# Patient Record
Sex: FEMALE | Race: WHITE | Employment: OTHER | URBAN - METROPOLITAN AREA
[De-identification: names, ages, dates, MRNs, and addresses within clinical notes are randomized per-mention and may not be internally consistent; named-entity substitution may affect disease eponyms.]

---

## 2017-02-18 ENCOUNTER — APPOINTMENT (OUTPATIENT)
Dept: GENERAL RADIOLOGY | Facility: HOSPITAL | Age: 52
End: 2017-02-18

## 2017-02-18 ENCOUNTER — HOSPITAL ENCOUNTER (EMERGENCY)
Facility: HOSPITAL | Age: 52
Discharge: HOME | End: 2017-02-18
Attending: EMERGENCY MEDICINE

## 2017-02-18 VITALS
BODY MASS INDEX: 41.28 KG/M2 | DIASTOLIC BLOOD PRESSURE: 68 MMHG | RESPIRATION RATE: 18 BRPM | SYSTOLIC BLOOD PRESSURE: 128 MMHG | HEIGHT: 67 IN | HEART RATE: 85 BPM | WEIGHT: 263 LBS | OXYGEN SATURATION: 99 % | TEMPERATURE: 98 F

## 2017-02-18 DIAGNOSIS — R07.89 CHEST WALL PAIN: Primary | ICD-10-CM

## 2017-02-18 LAB
ALBUMIN SERPL-MCNC: 4.2 G/DL (ref 3.5–4.8)
ALP LIVER SERPL-CCNC: 83 U/L (ref 41–108)
ALT SERPL-CCNC: 57 U/L (ref 14–54)
AST SERPL-CCNC: 32 U/L (ref 15–41)
BASOPHILS # BLD AUTO: 0.06 X10(3) UL (ref 0–0.1)
BASOPHILS NFR BLD AUTO: 0.9 %
BILIRUB SERPL-MCNC: 0.4 MG/DL (ref 0.1–2)
BUN BLD-MCNC: 19 MG/DL (ref 8–20)
CALCIUM BLD-MCNC: 8.5 MG/DL (ref 8.3–10.3)
CHLORIDE: 110 MMOL/L (ref 101–111)
CO2: 24 MMOL/L (ref 22–32)
CREAT BLD-MCNC: 0.97 MG/DL (ref 0.55–1.02)
EOSINOPHIL # BLD AUTO: 0.08 X10(3) UL (ref 0–0.3)
EOSINOPHIL NFR BLD AUTO: 1.2 %
ERYTHROCYTE [DISTWIDTH] IN BLOOD BY AUTOMATED COUNT: 12.5 % (ref 11.5–16)
GLUCOSE BLD-MCNC: 107 MG/DL (ref 70–99)
HCT VFR BLD AUTO: 45.1 % (ref 34–50)
HGB BLD-MCNC: 15.9 G/DL (ref 12–16)
IMMATURE GRANULOCYTE COUNT: 0.02 X10(3) UL (ref 0–1)
IMMATURE GRANULOCYTE RATIO %: 0.3 %
LYMPHOCYTES # BLD AUTO: 2.29 X10(3) UL (ref 0.9–4)
LYMPHOCYTES NFR BLD AUTO: 33.2 %
M PROTEIN MFR SERPL ELPH: 7.3 G/DL (ref 6.1–8.3)
MCH RBC QN AUTO: 32.4 PG (ref 27–33.2)
MCHC RBC AUTO-ENTMCNC: 35.3 G/DL (ref 31–37)
MCV RBC AUTO: 91.9 FL (ref 81–100)
MONOCYTES # BLD AUTO: 0.31 X10(3) UL (ref 0.1–0.6)
MONOCYTES NFR BLD AUTO: 4.5 %
NEUTROPHIL ABS PRELIM: 4.14 X10 (3) UL (ref 1.3–6.7)
NEUTROPHILS # BLD AUTO: 4.14 X10(3) UL (ref 1.3–6.7)
NEUTROPHILS NFR BLD AUTO: 59.9 %
PLATELET # BLD AUTO: 220 10(3)UL (ref 150–450)
POTASSIUM SERPL-SCNC: 4 MMOL/L (ref 3.6–5.1)
RBC # BLD AUTO: 4.91 X10(6)UL (ref 3.8–5.1)
RED CELL DISTRIBUTION WIDTH-SD: 41.7 FL (ref 35.1–46.3)
SODIUM SERPL-SCNC: 142 MMOL/L (ref 136–144)
TROPONIN: <0.046 NG/ML (ref ?–0.05)
WBC # BLD AUTO: 6.9 X10(3) UL (ref 4–13)

## 2017-02-18 PROCEDURE — 99284 EMERGENCY DEPT VISIT MOD MDM: CPT

## 2017-02-18 PROCEDURE — 93005 ELECTROCARDIOGRAM TRACING: CPT

## 2017-02-18 PROCEDURE — 84484 ASSAY OF TROPONIN QUANT: CPT | Performed by: EMERGENCY MEDICINE

## 2017-02-18 PROCEDURE — 85025 COMPLETE CBC W/AUTO DIFF WBC: CPT | Performed by: EMERGENCY MEDICINE

## 2017-02-18 PROCEDURE — 93010 ELECTROCARDIOGRAM REPORT: CPT

## 2017-02-18 PROCEDURE — 36415 COLL VENOUS BLD VENIPUNCTURE: CPT

## 2017-02-18 PROCEDURE — 99285 EMERGENCY DEPT VISIT HI MDM: CPT

## 2017-02-18 PROCEDURE — 80053 COMPREHEN METABOLIC PANEL: CPT | Performed by: EMERGENCY MEDICINE

## 2017-02-18 PROCEDURE — 71010 XR CHEST AP PORTABLE  (CPT=71010): CPT

## 2017-02-18 RX ORDER — ONDANSETRON 2 MG/ML
4 INJECTION INTRAMUSCULAR; INTRAVENOUS ONCE
Status: COMPLETED | OUTPATIENT
Start: 2017-02-18 | End: 2017-02-18

## 2017-02-18 RX ORDER — VALSARTAN 320 MG/1
320 TABLET ORAL DAILY
COMMUNITY
End: 2017-07-13

## 2017-02-18 NOTE — ED INITIAL ASSESSMENT (HPI)
Pt presents to ER a sternal to the left side chest pain. Yes SOB. Pain has been coming and going. Pt is speak clearly. Skin warm and dry. Respirations equal and nonlabored. Pt is a&ox3. Yes nausea coming and going. Yes fatigue. Chronic HAs. Cold sweats.  No

## 2017-02-18 NOTE — ED PROVIDER NOTES
Patient Seen in: [facility] Emergency Department    History   Patient presents with:  Chest Pain Angina (cardiovascular)    Stated Complaint: chest pain    HPI    41-year-old female presents emergency department who has some pain on the left side of -40 MG Oral Tab,  Take 1 tablet by mouth every 4 (four) hours as needed for Pain. Ondansetron HCl (ZOFRAN) 4 mg tablet,  Take 1 tablet by mouth every 8 (eight) hours as needed for Nausea.    ClonazePAM 2 MG Oral Tablet Dispersible,  Take 2 mg by marily SYRINGE) 25G X 5/8\" 3 ML Does not apply Misc,  USE ONCE MONTHLY AS DIRECTED   LUNESTA 3 MG OR TABS,  1 TABLET AT BEDTIME PRN       Family History   Problem Relation Age of Onset   • Diabetes Mother    • Sonda Marie Mother    • Stroke Mother    • to the RSD she states Neuro: Cranial nerves II through XII intact with no gross focal sensory or motor abnormality.       ED Course     Labs Reviewed   COMP METABOLIC PANEL (14) - Abnormal; Notable for the following:     Glucose 107 (*)     Alt 57 (*)     A Patient was made aware of medical condition and instructed to take medications as prescribed. Patient is aware that they are to return to ED if any worsening problems. Patient was also instructed to followup with the appropriate physician.   Patient

## 2017-02-20 LAB
ATRIAL RATE: 88 BPM
P AXIS: 51 DEGREES
P-R INTERVAL: 158 MS
Q-T INTERVAL: 398 MS
QRS DURATION: 92 MS
QTC CALCULATION (BEZET): 481 MS
R AXIS: 55 DEGREES
T AXIS: 55 DEGREES
VENTRICULAR RATE: 88 BPM

## 2017-10-04 ENCOUNTER — APPOINTMENT (OUTPATIENT)
Dept: LAB | Facility: HOSPITAL | Age: 52
End: 2017-10-04
Payer: MEDICARE

## 2017-10-04 ENCOUNTER — HOSPITAL ENCOUNTER (OUTPATIENT)
Dept: ULTRASOUND IMAGING | Facility: HOSPITAL | Age: 52
Discharge: HOME | End: 2017-10-04
Attending: SURGERY
Payer: MEDICARE

## 2017-10-04 DIAGNOSIS — K80.10 CALCULUS OF GALLBLADDER WITH CHOLECYSTITIS WITHOUT BILIARY OBSTRUCTION, UNSPECIFIED CHOLECYSTITIS ACUITY: ICD-10-CM

## 2017-10-04 DIAGNOSIS — K80.10 CHOLECYSTITIS WITH CHOLELITHIASIS: ICD-10-CM

## 2017-10-04 PROCEDURE — 76700 US EXAM ABDOM COMPLETE: CPT | Performed by: SURGERY

## 2017-10-04 PROCEDURE — 36415 COLL VENOUS BLD VENIPUNCTURE: CPT

## 2017-10-04 PROCEDURE — 80053 COMPREHEN METABOLIC PANEL: CPT

## 2017-10-09 ENCOUNTER — ANESTHESIA (OUTPATIENT)
Dept: SURGERY | Facility: HOSPITAL | Age: 52
End: 2017-10-09
Payer: MEDICARE

## 2017-10-09 ENCOUNTER — ANESTHESIA EVENT (OUTPATIENT)
Dept: SURGERY | Facility: HOSPITAL | Age: 52
End: 2017-10-09
Payer: MEDICARE

## 2017-10-09 ENCOUNTER — HOSPITAL ENCOUNTER (OUTPATIENT)
Facility: HOSPITAL | Age: 52
Setting detail: HOSPITAL OUTPATIENT SURGERY
Discharge: HOME | End: 2017-10-09
Attending: SURGERY | Admitting: SURGERY
Payer: MEDICARE

## 2017-10-09 ENCOUNTER — SURGERY (OUTPATIENT)
Age: 52
End: 2017-10-09

## 2017-10-09 VITALS
OXYGEN SATURATION: 100 % | RESPIRATION RATE: 18 BRPM | SYSTOLIC BLOOD PRESSURE: 115 MMHG | WEIGHT: 250 LBS | TEMPERATURE: 99 F | HEIGHT: 67 IN | HEART RATE: 88 BPM | BODY MASS INDEX: 39.24 KG/M2 | DIASTOLIC BLOOD PRESSURE: 83 MMHG

## 2017-10-09 DIAGNOSIS — K80.10 CHOLECYSTITIS WITH CHOLELITHIASIS: Primary | ICD-10-CM

## 2017-10-09 PROCEDURE — 0FT44ZZ RESECTION OF GALLBLADDER, PERCUTANEOUS ENDOSCOPIC APPROACH: ICD-10-PCS | Performed by: SURGERY

## 2017-10-09 PROCEDURE — 85027 COMPLETE CBC AUTOMATED: CPT

## 2017-10-09 PROCEDURE — 88304 TISSUE EXAM BY PATHOLOGIST: CPT | Performed by: SURGERY

## 2017-10-09 RX ORDER — HYDROCODONE BITARTRATE AND ACETAMINOPHEN 5; 325 MG/1; MG/1
1 TABLET ORAL EVERY 6 HOURS PRN
Qty: 28 TABLET | Refills: 3 | Status: SHIPPED | OUTPATIENT
Start: 2017-10-09 | End: 2018-03-19 | Stop reason: ALTCHOICE

## 2017-10-09 RX ORDER — BUPIVACAINE HYDROCHLORIDE AND EPINEPHRINE 5; 5 MG/ML; UG/ML
INJECTION, SOLUTION EPIDURAL; INTRACAUDAL; PERINEURAL AS NEEDED
Status: DISCONTINUED | OUTPATIENT
Start: 2017-10-09 | End: 2017-10-09 | Stop reason: HOSPADM

## 2017-10-09 RX ORDER — ONDANSETRON 2 MG/ML
4 INJECTION INTRAMUSCULAR; INTRAVENOUS ONCE
Status: COMPLETED | OUTPATIENT
Start: 2017-10-09 | End: 2017-10-09

## 2017-10-09 RX ORDER — MEPERIDINE HYDROCHLORIDE 25 MG/ML
12.5 INJECTION INTRAMUSCULAR; INTRAVENOUS; SUBCUTANEOUS AS NEEDED
Status: DISCONTINUED | OUTPATIENT
Start: 2017-10-09 | End: 2017-10-09

## 2017-10-09 RX ORDER — SCOLOPAMINE TRANSDERMAL SYSTEM 1 MG/1
PATCH, EXTENDED RELEASE TRANSDERMAL
Status: COMPLETED
Start: 2017-10-09 | End: 2017-10-09

## 2017-10-09 RX ORDER — ONDANSETRON 2 MG/ML
4 INJECTION INTRAMUSCULAR; INTRAVENOUS AS NEEDED
Status: DISCONTINUED | OUTPATIENT
Start: 2017-10-09 | End: 2017-10-09

## 2017-10-09 RX ORDER — HYDROMORPHONE HYDROCHLORIDE 1 MG/ML
0.4 INJECTION, SOLUTION INTRAMUSCULAR; INTRAVENOUS; SUBCUTANEOUS EVERY 5 MIN PRN
Status: DISCONTINUED | OUTPATIENT
Start: 2017-10-09 | End: 2017-10-09

## 2017-10-09 RX ORDER — HYDROMORPHONE HYDROCHLORIDE 1 MG/ML
INJECTION, SOLUTION INTRAMUSCULAR; INTRAVENOUS; SUBCUTANEOUS
Status: COMPLETED
Start: 2017-10-09 | End: 2017-10-09

## 2017-10-09 RX ORDER — MIDAZOLAM HYDROCHLORIDE 1 MG/ML
1 INJECTION INTRAMUSCULAR; INTRAVENOUS EVERY 5 MIN PRN
Status: DISCONTINUED | OUTPATIENT
Start: 2017-10-09 | End: 2017-10-09

## 2017-10-09 RX ORDER — HEPARIN SODIUM 5000 [USP'U]/ML
5000 INJECTION, SOLUTION INTRAVENOUS; SUBCUTANEOUS ONCE
Status: COMPLETED | OUTPATIENT
Start: 2017-10-09 | End: 2017-10-09

## 2017-10-09 RX ORDER — HYDROCODONE BITARTRATE AND ACETAMINOPHEN 10; 325 MG/1; MG/1
2 TABLET ORAL ONCE
Status: COMPLETED | OUTPATIENT
Start: 2017-10-09 | End: 2017-10-09

## 2017-10-09 RX ORDER — NALOXONE HYDROCHLORIDE 0.4 MG/ML
80 INJECTION, SOLUTION INTRAMUSCULAR; INTRAVENOUS; SUBCUTANEOUS AS NEEDED
Status: DISCONTINUED | OUTPATIENT
Start: 2017-10-09 | End: 2017-10-09

## 2017-10-09 RX ORDER — SODIUM CHLORIDE, SODIUM LACTATE, POTASSIUM CHLORIDE, CALCIUM CHLORIDE 600; 310; 30; 20 MG/100ML; MG/100ML; MG/100ML; MG/100ML
INJECTION, SOLUTION INTRAVENOUS CONTINUOUS
Status: DISCONTINUED | OUTPATIENT
Start: 2017-10-09 | End: 2017-10-09

## 2017-10-09 RX ORDER — SCOLOPAMINE TRANSDERMAL SYSTEM 1 MG/1
1 PATCH, EXTENDED RELEASE TRANSDERMAL
Status: DISCONTINUED | OUTPATIENT
Start: 2017-10-09 | End: 2017-10-09

## 2017-10-09 RX ORDER — ONDANSETRON 2 MG/ML
INJECTION INTRAMUSCULAR; INTRAVENOUS
Status: COMPLETED
Start: 2017-10-09 | End: 2017-10-09

## 2017-10-09 RX ORDER — MAGNESIUM HYDROXIDE 1200 MG/15ML
LIQUID ORAL CONTINUOUS PRN
Status: DISCONTINUED | OUTPATIENT
Start: 2017-10-09 | End: 2017-10-09

## 2017-10-09 NOTE — INTERVAL H&P NOTE
Pre-op Diagnosis: CHOLECYSTITIS WITH CHOLELITHEANS    The above referenced H&P was reviewed by Francisca Her MD on 10/9/2017, the patient was examined and no significant changes have occurred in the patient's condition since the H&P was performed.   I discuss

## 2017-10-09 NOTE — OPERATIVE REPORT
Report of [address] Patient Status:  Hospital Outpatient Surgery    1965 MRN [id number]   [facility] SURGERY Attending Francois Smith MD   H Gallbladder fossa and right gutter was irrigated until clear and hemostasis was achieved. The pneumoperitoneum was decompressed with suction and all ports removed. The umbilical incision was closed with 0 Vicryl.  All skin incisions were closed with 4-0 [place] Ottoniel

## 2017-10-09 NOTE — ANESTHESIA PREPROCEDURE EVALUATION
PRE-OP EVALUATION    Patient Name: Evan Hernandez    Pre-op Diagnosis: [address]    Procedure(s):  LAPAROSCOPIC CHOLECYSTECTOMY, POSSIBLE OPEN    Surgeon(s) and Role:     Anna Gallagher MD - Primary    Pre-op vitals reviewed.   Temp: tablet by mouth daily. Disp: 30 tablet Rfl: 11   Pantoprazole Sodium (PROTONIX) 20 MG Oral Tab EC Take 20 mg by mouth 3 (three) times daily.  Disp:  Rfl:    Tapentadol HCl (NUCYNTA) 50 MG Oral Tab 1 TABLET EVERY 4 TO 6 HOURS AS NEEDED Disp:  Rfl:    Syringe RDW 11.9 10/09/2017   .0 10/09/2017       Lab Results  Component Value Date    10/04/2017   K 3.7 10/04/2017    10/04/2017   CO2 24.0 10/04/2017   BUN 14 10/04/2017   CREATSERUM 0.92 10/04/2017   GLU 87 10/04/2017   CA 9.3 10/04/2017

## 2017-10-09 NOTE — H&P (VIEW-ONLY)
10/4/2017    Patient presents with:  New Patient: Gallbladder Ref: Dr. Lamar      HPI:    Willy Tillman is a 45year old female who presents for evaluation of abdominal pain. Patient describes abdominal pain which is intermittent.  Patient is noted this i Oral Tab Take 1 tablet (175 mcg total) by mouth daily.  Disp: 90 tablet Rfl: 3   CYANOCOBALAMIN 1000 MCG/ML Injection Solution 1000 MCG intramuscularly each month Disp: 10 mL Rfl: 1   Butalbital-APAP-Caffeine (FIORICET) -40 MG Oral Tab Take 1 tablet b bandaids  Amoxicillin             Anaphylaxis  Ampicillin                Clindamycin Hcl             Comment:Infections, thrush  Compazine [Prochlor*    Palpitations  Pcns [Penicillins]      Unknown    Comment:All cillin drugs  Sulfamylon [Mafenid*

## 2017-10-09 NOTE — ANESTHESIA POSTPROCEDURE EVALUATION
[address] Patient Status:  Hospital Outpatient Surgery   Age/Gender 45year old female MRN [id number]   Location [facility] Attending Nataliya Apple MD   Hosp Day # 0 PCP MD Danial Kuhn

## 2018-06-06 PROCEDURE — 88304 TISSUE EXAM BY PATHOLOGIST: CPT | Performed by: FAMILY MEDICINE

## 2018-08-02 ENCOUNTER — HOSPITAL ENCOUNTER (OUTPATIENT)
Age: 53
Discharge: HOME | End: 2018-08-02
Attending: FAMILY MEDICINE
Payer: MEDICARE

## 2018-08-02 ENCOUNTER — APPOINTMENT (OUTPATIENT)
Dept: GENERAL RADIOLOGY | Age: 53
End: 2018-08-02
Attending: FAMILY MEDICINE
Payer: MEDICARE

## 2018-08-02 VITALS
SYSTOLIC BLOOD PRESSURE: 135 MMHG | HEART RATE: 90 BPM | DIASTOLIC BLOOD PRESSURE: 76 MMHG | OXYGEN SATURATION: 98 % | HEIGHT: 67 IN | RESPIRATION RATE: 20 BRPM | WEIGHT: 231 LBS | BODY MASS INDEX: 36.26 KG/M2 | TEMPERATURE: 97 F

## 2018-08-02 DIAGNOSIS — G90.50 RSD (REFLEX SYMPATHETIC DYSTROPHY): Primary | ICD-10-CM

## 2018-08-02 PROCEDURE — 99213 OFFICE O/P EST LOW 20 MIN: CPT

## 2018-08-02 PROCEDURE — 99203 OFFICE O/P NEW LOW 30 MIN: CPT

## 2018-08-02 PROCEDURE — 74018 RADEX ABDOMEN 1 VIEW: CPT | Performed by: FAMILY MEDICINE

## 2018-08-02 NOTE — ED INITIAL ASSESSMENT (HPI)
Pt requesting xray of implanted neuro stimulator - placed in area of left flank. Pt has hx of RSD. States her neuro stimulator is malfunctioning. Pt wants visual of battery and wires - states prefers resolution of malfunction rather than medication.

## 2018-08-04 ENCOUNTER — HOSPITAL ENCOUNTER (OUTPATIENT)
Dept: GENERAL RADIOLOGY | Facility: HOSPITAL | Age: 53
Discharge: HOME | End: 2018-08-04
Attending: PAIN MEDICINE

## 2018-08-04 ENCOUNTER — HOSPITAL ENCOUNTER (OUTPATIENT)
Dept: GENERAL RADIOLOGY | Facility: HOSPITAL | Age: 53
Discharge: HOME | End: 2018-08-04
Attending: PAIN MEDICINE
Payer: OTHER MISCELLANEOUS

## 2018-08-04 DIAGNOSIS — M54.42 LUMBAGO WITH SCIATICA, LEFT SIDE: ICD-10-CM

## 2018-08-04 PROCEDURE — 72020 X-RAY EXAM OF SPINE 1 VIEW: CPT | Performed by: PAIN MEDICINE

## 2018-08-04 PROCEDURE — 72100 X-RAY EXAM L-S SPINE 2/3 VWS: CPT | Performed by: PAIN MEDICINE

## 2018-08-04 NOTE — ED PROVIDER NOTES
Patient Seen in: [facility]    History   Patient presents with:  Pain (neurologic)    Stated Complaint: \"rsd\" per patient    HPI      47year old female with history of RSD presents for lower back pain.  States she has chronic Bilateral  5/20/1994: TUBAL LIGATION    Family history reviewed and is not pertinent to presenting problem.     Smoking status: Former Smoker                                                              Packs/day: 1.00      Years: 25.00        Types: Arlette Lovett with neurostimulator with tips T8 level. Patient will follow up with Neurosurgery.        Disposition and Plan     Clinical Impression:  RSD (reflex sympathetic dystrophy)  (primary encounter diagnosis)    Disposition:  Discharge  8/2/2018 12:58 pm    F

## 2019-03-30 PROCEDURE — 87086 URINE CULTURE/COLONY COUNT: CPT | Performed by: FAMILY MEDICINE

## 2019-04-01 ENCOUNTER — HOSPITAL ENCOUNTER (EMERGENCY)
Facility: HOSPITAL | Age: 54
Discharge: HOME | End: 2019-04-01
Attending: EMERGENCY MEDICINE
Payer: MEDICARE

## 2019-04-01 VITALS
DIASTOLIC BLOOD PRESSURE: 88 MMHG | WEIGHT: 210 LBS | SYSTOLIC BLOOD PRESSURE: 132 MMHG | HEIGHT: 67 IN | BODY MASS INDEX: 32.96 KG/M2 | RESPIRATION RATE: 18 BRPM | TEMPERATURE: 96 F | OXYGEN SATURATION: 97 % | HEART RATE: 88 BPM

## 2019-04-01 DIAGNOSIS — N20.0 KIDNEY STONE: ICD-10-CM

## 2019-04-01 DIAGNOSIS — R10.9 LEFT FLANK PAIN: Primary | ICD-10-CM

## 2019-04-01 DIAGNOSIS — R11.0 NAUSEA: ICD-10-CM

## 2019-04-01 PROCEDURE — 99284 EMERGENCY DEPT VISIT MOD MDM: CPT

## 2019-04-01 PROCEDURE — 96372 THER/PROPH/DIAG INJ SC/IM: CPT

## 2019-04-01 PROCEDURE — 99283 EMERGENCY DEPT VISIT LOW MDM: CPT

## 2019-04-01 RX ORDER — KETOROLAC TROMETHAMINE 30 MG/ML
INJECTION, SOLUTION INTRAMUSCULAR; INTRAVENOUS
Status: COMPLETED
Start: 2019-04-01 | End: 2019-04-01

## 2019-04-01 RX ORDER — ONDANSETRON 4 MG/1
4 TABLET, ORALLY DISINTEGRATING ORAL ONCE
Status: COMPLETED | OUTPATIENT
Start: 2019-04-01 | End: 2019-04-01

## 2019-04-01 RX ORDER — LORAZEPAM 2 MG/ML
1 INJECTION INTRAMUSCULAR ONCE
Status: DISCONTINUED | OUTPATIENT
Start: 2019-04-01 | End: 2019-04-01

## 2019-04-01 RX ORDER — ONDANSETRON 2 MG/ML
4 INJECTION INTRAMUSCULAR; INTRAVENOUS ONCE
Status: DISCONTINUED | OUTPATIENT
Start: 2019-04-01 | End: 2019-04-01

## 2019-04-01 RX ORDER — KETAMINE HYDROCHLORIDE 50 MG/ML
1 INJECTION, SOLUTION, CONCENTRATE INTRAMUSCULAR; INTRAVENOUS ONCE
Status: DISCONTINUED | OUTPATIENT
Start: 2019-04-01 | End: 2019-04-01

## 2019-04-01 RX ORDER — ONDANSETRON 4 MG/1
4 TABLET, ORALLY DISINTEGRATING ORAL EVERY 4 HOURS PRN
Qty: 10 TABLET | Refills: 0 | Status: SHIPPED | OUTPATIENT
Start: 2019-04-01 | End: 2019-04-08

## 2019-04-01 RX ORDER — KETOROLAC TROMETHAMINE 30 MG/ML
30 INJECTION, SOLUTION INTRAMUSCULAR; INTRAVENOUS EVERY 6 HOURS PRN
Status: DISCONTINUED | OUTPATIENT
Start: 2019-04-01 | End: 2019-04-01

## 2019-04-01 NOTE — ED INITIAL ASSESSMENT (HPI)
Abdominal/back pain states that she has kidney stones. States burning with urination. States that she is nauseated and having stomach pain from medication. Had implant to back done 3 weeks ago. Pt has been taking cipro, as well as pain medication.

## 2019-04-01 NOTE — ED PROVIDER NOTES
Patient Seen in: [facility] Emergency Department    History   Patient presents with:  Nausea/Vomiting/Diarrhea (gastrointestinal)  Abdominal Pain    Stated Complaint:     HPI    50-year-old female past medical history of complex regional pain syndro • IMPLANTABLE ELECT NERVE STIM  05/2018    [place]   • LAPAROSCOPIC CHOLECYSTECTOMY N/A 10/9/2017    Performed by Hamida Kwan MD at [address] [facility]  7/2008    L5-S1   • THYROID LOBECTOMY,UNILAT     • TONSILLECTOMY Bilateral to display             MDM   14-year-old female presents ED with complaints of left-sided back pain. Vital signs stable on arrival patient appears nontoxic on examination. Patient refusing let me palpate her left CVA region sitting it hurts too much.   Ab

## 2019-09-01 NOTE — OR NURSING
Dr. John Mosquera notified that patient wants to be discharged home now, but CAROLE protocol ordered and it is not time for patient to be d/c'd home. Patient O2 sats % on room air. Per Dr. John Mosquera, ok to discharge patient home at this time.
Fax sent and I spoke with rocio at Dr. Linton Or office regarding patients allergy to pre op antibiotics. She will give the fax to Dr. Austin Bermeo.
Pt requesting zofran for nausea, Dr Sheryle Bird called and IV Zofran ordered. Pt also refusing to keep OR gown on, cloth gown given per her request.   1520 Pt c/o pain at the IV site. IV discontinued.
Received antibiotic contraindication per fax from [address] [place] [address] office. Form is signed but MD did not specify an antibiotic to give or check an override reason for existing antibiotic protocol.  Spoke w [facility] office and she states Benjy Mcleod not return to o
ABDOMINAL PAIN

## 2020-04-25 ENCOUNTER — HOSPITAL ENCOUNTER (EMERGENCY)
Facility: HOSPITAL | Age: 55
Discharge: LEFT BEFORE DISCHARGE | End: 2020-04-25
Attending: EMERGENCY MEDICINE
Payer: MEDICARE

## 2020-04-25 VITALS
HEART RATE: 95 BPM | TEMPERATURE: 97 F | OXYGEN SATURATION: 99 % | DIASTOLIC BLOOD PRESSURE: 99 MMHG | RESPIRATION RATE: 18 BRPM | SYSTOLIC BLOOD PRESSURE: 115 MMHG

## 2020-04-25 DIAGNOSIS — R07.9 CHEST PAIN OF UNKNOWN ETIOLOGY: Primary | ICD-10-CM

## 2020-04-25 PROCEDURE — 84484 ASSAY OF TROPONIN QUANT: CPT | Performed by: EMERGENCY MEDICINE

## 2020-04-25 PROCEDURE — 99284 EMERGENCY DEPT VISIT MOD MDM: CPT

## 2020-04-25 PROCEDURE — 85379 FIBRIN DEGRADATION QUANT: CPT | Performed by: EMERGENCY MEDICINE

## 2020-04-25 PROCEDURE — 36415 COLL VENOUS BLD VENIPUNCTURE: CPT

## 2020-04-25 PROCEDURE — 93005 ELECTROCARDIOGRAM TRACING: CPT

## 2020-04-25 PROCEDURE — 93010 ELECTROCARDIOGRAM REPORT: CPT

## 2020-04-25 NOTE — ED NOTES
Patient refusing CMP, CBC and CXR at this time. Patient verbally agrees to D-Dimer, Troponin and EKG at this time. Orders for CMP, CBC and CXR were cancelled by this RN.   was notified to cancel CMP and CBC per patient request, verbalized unde

## 2020-04-25 NOTE — ED INITIAL ASSESSMENT (HPI)
Patient states she had left sided pain which started yesterday morning. She had a migraine in the afternoon. At 8:30 pm last night, she felt the pain and felt lightheaded last night and took two 81 mg ASA. She states she felt better this morning.  States th

## 2020-04-25 NOTE — ED PROVIDER NOTES
Patient Seen in: [facility] Emergency Department      History   Patient presents with:  Chest Pain Angina    Stated Complaint: Chest Pain    HPI    Patient is 44-year-old woman coming with chest pain.   Needlelike chest discomfort started last night Tenderness: There is no tenderness. Musculoskeletal: Normal range of motion. General: No tenderness. Skin:     General: Skin is warm and dry. Findings: No rash.    Neurological:      Mental Status: She is alert and oriented to person, place Plan     Clinical Impression:  Chest pain of unknown etiology  (primary encounter diagnosis)    Disposition:  [place]  4/25/2020 10:49 am    Follow-up:  No follow-up provider specified.         Medications Prescribed:  Discharge Medication List as of 4/25/2020

## 2020-04-25 NOTE — ED NOTES
Pt arrives to triage very aggravated. Pt states she has angina issues. Upon this PCT asking her to sit in wheel chair, pt refuses despite explaining policy for her symptoms. Pt states she needs to use restroom immediately  before doing anything.  I explaine

## 2020-08-27 ENCOUNTER — HOSPITAL ENCOUNTER (EMERGENCY)
Facility: HOSPITAL | Age: 55
Discharge: HOME | End: 2020-08-27
Attending: STUDENT IN AN ORGANIZED HEALTH CARE EDUCATION/TRAINING PROGRAM
Payer: MEDICARE

## 2020-08-27 VITALS
SYSTOLIC BLOOD PRESSURE: 155 MMHG | HEIGHT: 67 IN | RESPIRATION RATE: 17 BRPM | TEMPERATURE: 98 F | BODY MASS INDEX: 34.53 KG/M2 | DIASTOLIC BLOOD PRESSURE: 113 MMHG | OXYGEN SATURATION: 98 % | HEART RATE: 89 BPM | WEIGHT: 220 LBS

## 2020-08-27 DIAGNOSIS — R10.2 ABDOMINAL PAIN, SUPRAPUBIC: ICD-10-CM

## 2020-08-27 DIAGNOSIS — N30.00 ACUTE CYSTITIS WITHOUT HEMATURIA: Primary | ICD-10-CM

## 2020-08-27 LAB
ALBUMIN SERPL-MCNC: 4.3 G/DL (ref 3.4–5)
ALBUMIN/GLOB SERPL: 1.3 {RATIO} (ref 1–2)
ALP LIVER SERPL-CCNC: 75 U/L (ref 41–108)
ALT SERPL-CCNC: 44 U/L (ref 13–56)
ANION GAP SERPL CALC-SCNC: 5 MMOL/L (ref 0–18)
AST SERPL-CCNC: 29 U/L (ref 15–37)
BASOPHILS # BLD AUTO: 0.04 X10(3) UL (ref 0–0.2)
BASOPHILS NFR BLD AUTO: 0.5 %
BILIRUB SERPL-MCNC: 0.5 MG/DL (ref 0.1–2)
BILIRUB UR QL STRIP.AUTO: NEGATIVE
BUN BLD-MCNC: 18 MG/DL (ref 7–18)
BUN/CREAT SERPL: 19.4 (ref 10–20)
CALCIUM BLD-MCNC: 9.2 MG/DL (ref 8.5–10.1)
CHLORIDE SERPL-SCNC: 110 MMOL/L (ref 98–112)
CLARITY UR REFRACT.AUTO: CLEAR
CO2 SERPL-SCNC: 25 MMOL/L (ref 21–32)
COLOR UR AUTO: YELLOW
CREAT BLD-MCNC: 0.93 MG/DL (ref 0.55–1.02)
DEPRECATED RDW RBC AUTO: 42 FL (ref 35.1–46.3)
EOSINOPHIL # BLD AUTO: 0.1 X10(3) UL (ref 0–0.7)
EOSINOPHIL NFR BLD AUTO: 1.2 %
ERYTHROCYTE [DISTWIDTH] IN BLOOD BY AUTOMATED COUNT: 12.2 % (ref 11–15)
GLOBULIN PLAS-MCNC: 3.4 G/DL (ref 2.8–4.4)
GLUCOSE BLD-MCNC: 96 MG/DL (ref 70–99)
GLUCOSE UR STRIP.AUTO-MCNC: NEGATIVE MG/DL
HCT VFR BLD AUTO: 47.8 % (ref 35–48)
HGB BLD-MCNC: 15.9 G/DL (ref 12–16)
IMM GRANULOCYTES # BLD AUTO: 0.06 X10(3) UL (ref 0–1)
IMM GRANULOCYTES NFR BLD: 0.7 %
KETONES UR STRIP.AUTO-MCNC: 40 MG/DL
LIPASE SERPL-CCNC: 134 U/L (ref 73–393)
LYMPHOCYTES # BLD AUTO: 2.76 X10(3) UL (ref 1–4)
LYMPHOCYTES NFR BLD AUTO: 33.7 %
M PROTEIN MFR SERPL ELPH: 7.7 G/DL (ref 6.4–8.2)
MCH RBC QN AUTO: 31.2 PG (ref 26–34)
MCHC RBC AUTO-ENTMCNC: 33.3 G/DL (ref 31–37)
MCV RBC AUTO: 93.7 FL (ref 80–100)
MONOCYTES # BLD AUTO: 0.4 X10(3) UL (ref 0.1–1)
MONOCYTES NFR BLD AUTO: 4.9 %
NEUTROPHILS # BLD AUTO: 4.82 X10 (3) UL (ref 1.5–7.7)
NEUTROPHILS # BLD AUTO: 4.82 X10(3) UL (ref 1.5–7.7)
NEUTROPHILS NFR BLD AUTO: 59 %
NITRITE UR QL STRIP.AUTO: NEGATIVE
OSMOLALITY SERPL CALC.SUM OF ELEC: 292 MOSM/KG (ref 275–295)
PH UR STRIP.AUTO: 5.5 [PH] (ref 4.5–8)
PLATELET # BLD AUTO: 221 10(3)UL (ref 150–450)
POTASSIUM SERPL-SCNC: 3.5 MMOL/L (ref 3.5–5.1)
PROT UR STRIP.AUTO-MCNC: NEGATIVE MG/DL
RBC # BLD AUTO: 5.1 X10(6)UL (ref 3.8–5.3)
SODIUM SERPL-SCNC: 140 MMOL/L (ref 136–145)
SP GR UR STRIP.AUTO: >=1.03 (ref 1–1.03)
UROBILINOGEN UR STRIP.AUTO-MCNC: 0.2 MG/DL
WBC # BLD AUTO: 8.2 X10(3) UL (ref 4–11)

## 2020-08-27 PROCEDURE — 36415 COLL VENOUS BLD VENIPUNCTURE: CPT

## 2020-08-27 PROCEDURE — 80053 COMPREHEN METABOLIC PANEL: CPT | Performed by: STUDENT IN AN ORGANIZED HEALTH CARE EDUCATION/TRAINING PROGRAM

## 2020-08-27 PROCEDURE — 83690 ASSAY OF LIPASE: CPT | Performed by: STUDENT IN AN ORGANIZED HEALTH CARE EDUCATION/TRAINING PROGRAM

## 2020-08-27 PROCEDURE — 87086 URINE CULTURE/COLONY COUNT: CPT | Performed by: STUDENT IN AN ORGANIZED HEALTH CARE EDUCATION/TRAINING PROGRAM

## 2020-08-27 PROCEDURE — 99283 EMERGENCY DEPT VISIT LOW MDM: CPT

## 2020-08-27 PROCEDURE — 81001 URINALYSIS AUTO W/SCOPE: CPT | Performed by: STUDENT IN AN ORGANIZED HEALTH CARE EDUCATION/TRAINING PROGRAM

## 2020-08-27 PROCEDURE — 85025 COMPLETE CBC W/AUTO DIFF WBC: CPT | Performed by: STUDENT IN AN ORGANIZED HEALTH CARE EDUCATION/TRAINING PROGRAM

## 2020-08-27 RX ORDER — PHENAZOPYRIDINE HYDROCHLORIDE 100 MG/1
100 TABLET, FILM COATED ORAL 3 TIMES DAILY PRN
Qty: 6 TABLET | Refills: 0 | Status: SHIPPED | OUTPATIENT
Start: 2020-08-27 | End: 2020-09-03

## 2020-08-27 RX ORDER — NITROFURANTOIN 25; 75 MG/1; MG/1
100 CAPSULE ORAL 2 TIMES DAILY
Qty: 14 CAPSULE | Refills: 0 | Status: SHIPPED | OUTPATIENT
Start: 2020-08-27 | End: 2020-09-03

## 2020-08-28 NOTE — ED NOTES
Pt refusing to put gown on, pt states there is no need for that because she will \"nit pick\" at what she will allow to be done, states she does not want any tests to be done because she does not need any testing done, she just needs something to help with

## 2020-08-28 NOTE — ED INITIAL ASSESSMENT (HPI)
Pt reports hx of neurogenic bladder, states she has a follow up appointment in 2 weeks with her urologist. Complains of increasing bladder pain. Reports urine has been clear, no blood clots.  States that she has already had her tests done and is waiting for

## 2020-08-28 NOTE — ED NOTES
Patient sitting on cot. Patient reports, \"I know what's wrong with me, but I can't get to my doctor for another 2 weeks and I need to control this pain. \" Patient does not wish to get into a gown and states, \"I don't need any tests done.  They've already

## 2020-08-28 NOTE — ED PROVIDER NOTES
Patient Seen in: [facility] Emergency Department      History   Patient presents with:  Urinary Symptoms    Stated Complaint: UTI/kidney stones    HPI    Patient is a 58-year-old female who presents emergency department reporting suprapubic abdomina • LAPAROSCOPIC CHOLECYSTECTOMY N/A 10/9/2017    Performed by Sydnie Slaughter MD at [facility] MAIN OR   • [facility]  7/2008    L5-S1   • THYROID LOBECTOMY,UNILAT     • TONSILLECTOMY Bilateral 1965   • TUBAL LIGATION  5/20/1994 place, and time. Skin: Skin is warm and dry. No rash noted. Psychiatric: normal mood and affect.      ED Course     Labs Reviewed   URINALYSIS WITH CULTURE REFLEX - Abnormal; Notable for the following components:       Result Value    Ketones Urine 40 oral fluid challenge in the emergency department. Based on the patient's history, physical exam and emergency department workup I do not suspect underlying surgical pathology. Patient can be safely discharged home with outpatient follow-up.   She will f

## 2020-08-28 NOTE — ED NOTES
Registration contacted this RN and is reporting that the patient refused to consent to treatment with them. Spoke with patient and she agrees to consent to speaking with the doctor and no more at this time.  She states, \"I will talk with the doctor and if

## 2020-09-16 PROBLEM — N30.91 HEMORRHAGIC CYSTITIS: Status: ACTIVE | Noted: 2020-09-16

## 2023-01-13 ENCOUNTER — TELEPHONE (OUTPATIENT)
Facility: LOCATION | Age: 58
End: 2023-01-13

## 2023-01-13 NOTE — TELEPHONE ENCOUNTER
Patient calling to inform that she may call and schedule an appointment regarding her ear tubes with Dr. Christel Arguelles.

## (undated) DEVICE — SOL  .9 1000ML BTL

## (undated) DEVICE — TISSUE RETRIEVAL SYSTEM: Brand: INZII RETRIEVAL SYSTEM

## (undated) DEVICE — TROCAR: Brand: KII SHIELDED BLADED ACCESS SYSTEM

## (undated) DEVICE — KENDALL SCD EXPRESS SLEEVES, KNEE LENGTH, MEDIUM: Brand: KENDALL SCD

## (undated) DEVICE — #11 STERILE BLADE: Brand: POLYMER COATED BLADES

## (undated) DEVICE — UNDYED BRAIDED (POLYGLACTIN 910), SYNTHETIC ABSORBABLE SUTURE: Brand: COATED VICRYL

## (undated) DEVICE — CHLORAPREP 26ML APPLICATOR

## (undated) DEVICE — LAP CHOLE/APPY CDS-LF: Brand: MEDLINE INDUSTRIES, INC.

## (undated) DEVICE — LIGAMAX 5 MM ENDOSCOPIC MULTIPLE CLIP APPLIER: Brand: LIGAMAX

## (undated) DEVICE — TROCAR: Brand: KII FIOS FIRST ENTRY

## (undated) DEVICE — TROCAR: Brand: KII® SLEEVE

## (undated) DEVICE — SUTURE VICRYL 0 UR-6

## (undated) DEVICE — STERILE POLYISOPRENE POWDER-FREE SURGICAL GLOVES: Brand: PROTEXIS

## (undated) NOTE — ED AVS SNAPSHOT
Keri Boles   MRN: [id number]    Department:  [facility] Emergency Department   Date of Visit:  4/1/2019           Disclosure     Insurance plans vary and the physician(s) referred by the ER may not be covered by your plan.  Please contact your tell this physician (or your personal doctor if your instructions are to return to your personal doctor) about any new or lasting problems. The primary care or specialist physician will see patients referred from the [facility] Emergency Department.  Ray Hobbs

## (undated) NOTE — ED AVS SNAPSHOT
[facility] Emergency Department    [place]  [address] [address]    Phone:  [phone]    Fax:  [phone]           Keshawn Medina   MRN: [id number]    Department:  [facility] Emergency Department   Date of Visit:  2/18 IF THERE IS ANY CHANGE OR WORSENING OF YOUR CONDITION, CALL YOUR PRIMARY CARE PHYSICIAN AT ONCE OR RETURN IMMEDIATELY TO THE EMERGENCY DEPARTMENT.     If you have been prescribed any medication(s), please fill your prescription right away and begin taking t

## (undated) NOTE — ED AVS SNAPSHOT
[facility] Emergency Department    [place]  [address] [address]    Phone:  [phone]    Fax:  [phone]           Anika Nascimento   MRN: [id number]    Department:  [facility] Emergency Department   Date of Visit:  2/18 To Check ER Wait Times:  TEXT 'ERwait' to 01843      Click [email or web address]      Or call [phone]    If you have any problems with your follow-up, please call our  at [phone]    Si usted tiene algun problema con boogie sequimiento, por f I have read and understand the instructions given to me by my caregivers. 24-Hour Pharmacies        Pharmacy Address Phone Number   [address] [facility] ([address]) [address] [place] [address].  [facility] Dictated by: Saritha Mejia MD on 2/18/2017 at 13:05       Approved by: Saritha Mejia MD              Narrative:    PROCEDURE:  XR CHEST AP PORTABLE (CPT=71010)     TECHNIQUE:  AP chest radiograph was obtained.      COMPARISON:  [facility] , CHEST AP PORT,